# Patient Record
Sex: MALE | Race: WHITE | NOT HISPANIC OR LATINO | Employment: OTHER | URBAN - METROPOLITAN AREA
[De-identification: names, ages, dates, MRNs, and addresses within clinical notes are randomized per-mention and may not be internally consistent; named-entity substitution may affect disease eponyms.]

---

## 2018-05-11 ENCOUNTER — LAB REQUISITION (OUTPATIENT)
Dept: LAB | Facility: HOSPITAL | Age: 53
End: 2018-05-11

## 2018-05-11 ENCOUNTER — TRANSCRIBE ORDERS (OUTPATIENT)
Dept: LAB | Facility: HOSPITAL | Age: 53
End: 2018-05-11

## 2018-05-11 ENCOUNTER — LAB (OUTPATIENT)
Dept: LAB | Facility: HOSPITAL | Age: 53
End: 2018-05-11

## 2018-05-11 DIAGNOSIS — K50.119 CROHN'S DISEASE OF COLON WITH COMPLICATION (HCC): Primary | ICD-10-CM

## 2018-05-11 DIAGNOSIS — K74.60 CIRRHOSIS OF LIVER WITHOUT ASCITES, UNSPECIFIED HEPATIC CIRRHOSIS TYPE (HCC): ICD-10-CM

## 2018-05-11 DIAGNOSIS — K50.119 CROHN'S DISEASE OF COLON WITH COMPLICATION (HCC): ICD-10-CM

## 2018-05-11 DIAGNOSIS — Z12.11 ENCOUNTER FOR SCREENING FOR MALIGNANT NEOPLASM OF COLON: ICD-10-CM

## 2018-05-11 LAB
ANION GAP SERPL CALCULATED.3IONS-SCNC: 9 MMOL/L (ref 3–11)
BUN BLD-MCNC: 19 MG/DL (ref 9–23)
BUN/CREAT SERPL: 10.6 (ref 7–25)
CALCIUM SPEC-SCNC: 8.9 MG/DL (ref 8.7–10.4)
CHLORIDE SERPL-SCNC: 102 MMOL/L (ref 99–109)
CO2 SERPL-SCNC: 28 MMOL/L (ref 20–31)
CREAT BLD-MCNC: 1.8 MG/DL (ref 0.6–1.3)
GFR SERPL CREATININE-BSD FRML MDRD: 40 ML/MIN/1.73
GLUCOSE BLD-MCNC: 110 MG/DL (ref 70–100)
POTASSIUM BLD-SCNC: 4.2 MMOL/L (ref 3.5–5.5)
SODIUM BLD-SCNC: 139 MMOL/L (ref 132–146)

## 2018-05-11 PROCEDURE — 36415 COLL VENOUS BLD VENIPUNCTURE: CPT

## 2018-05-11 PROCEDURE — 88305 TISSUE EXAM BY PATHOLOGIST: CPT | Performed by: INTERNAL MEDICINE

## 2018-05-11 PROCEDURE — 80048 BASIC METABOLIC PNL TOTAL CA: CPT

## 2018-05-14 LAB
CYTO UR: NORMAL
LAB AP CASE REPORT: NORMAL
LAB AP CLINICAL INFORMATION: NORMAL
LAB AP DIAGNOSIS COMMENT: NORMAL
Lab: NORMAL
PATH REPORT.FINAL DX SPEC: NORMAL
PATH REPORT.GROSS SPEC: NORMAL

## 2019-04-10 RX ORDER — OMEPRAZOLE 40 MG/1
CAPSULE, DELAYED RELEASE ORAL
Qty: 30 CAPSULE | Refills: 4 | Status: SHIPPED | OUTPATIENT
Start: 2019-04-10 | End: 2019-12-05 | Stop reason: SDUPTHER

## 2019-09-23 ENCOUNTER — OFFICE VISIT (OUTPATIENT)
Dept: GASTROENTEROLOGY | Facility: CLINIC | Age: 54
End: 2019-09-23

## 2019-09-23 VITALS
DIASTOLIC BLOOD PRESSURE: 81 MMHG | HEIGHT: 74 IN | HEART RATE: 80 BPM | SYSTOLIC BLOOD PRESSURE: 150 MMHG | BODY MASS INDEX: 35.7 KG/M2 | WEIGHT: 278.2 LBS

## 2019-09-23 DIAGNOSIS — Z79.899 IMMUNOSUPPRESSION DUE TO DRUG THERAPY (HCC): ICD-10-CM

## 2019-09-23 DIAGNOSIS — K74.60 CIRRHOSIS OF LIVER WITHOUT ASCITES, UNSPECIFIED HEPATIC CIRRHOSIS TYPE (HCC): Primary | ICD-10-CM

## 2019-09-23 DIAGNOSIS — K50.10 CROHN'S DISEASE OF LARGE INTESTINE WITHOUT COMPLICATION (HCC): ICD-10-CM

## 2019-09-23 DIAGNOSIS — D84.821 IMMUNOSUPPRESSION DUE TO DRUG THERAPY (HCC): ICD-10-CM

## 2019-09-23 PROCEDURE — 99214 OFFICE O/P EST MOD 30 MIN: CPT | Performed by: INTERNAL MEDICINE

## 2019-09-23 RX ORDER — PROMETHAZINE HYDROCHLORIDE 25 MG/1
1 TABLET ORAL AS NEEDED
COMMUNITY

## 2019-09-23 RX ORDER — SERTRALINE HYDROCHLORIDE 100 MG/1
1 TABLET, FILM COATED ORAL DAILY
COMMUNITY

## 2019-09-23 RX ORDER — EZETIMIBE 10 MG/1
1 TABLET ORAL DAILY
COMMUNITY
Start: 2019-08-28

## 2019-09-23 RX ORDER — CYANOCOBALAMIN 1000 UG/ML
1 INJECTION, SOLUTION INTRAMUSCULAR; SUBCUTANEOUS WEEKLY
COMMUNITY

## 2019-09-23 RX ORDER — TAMSULOSIN HYDROCHLORIDE 0.4 MG/1
2 CAPSULE ORAL DAILY
COMMUNITY
Start: 2019-08-28

## 2019-09-23 RX ORDER — LOSARTAN POTASSIUM 25 MG/1
1 TABLET ORAL DAILY
COMMUNITY

## 2019-09-23 RX ORDER — OXICONAZOLE NITRATE 10 MG/G
1 CREAM TOPICAL AS NEEDED
COMMUNITY
End: 2021-01-08

## 2019-09-23 NOTE — PROGRESS NOTES
PCP:  Lawrence Richards PA     No referring provider defined for this encounter.    Chief Complaint   Patient presents with   • Crohn's Disease        HPI   Patient comes in for follow-up.  Unfortunately his father recently  and is been under increased stress.  He maintains himself on the Cimzia although it at times has trouble getting it from his insurance company.  He overall is doing well from the standpoint of his Crohn's disease but if he goes longer than 6 weeks without his Cimzia he has more difficulties.  He had an upper endoscopy in 2018 and no varices were seen at that time.  He had a colonoscopy as well which showed fairly inactive disease with some pseudopolyps this was done in May 2018.  He gets regular blood work by his family physician.    Allergies   Allergen Reactions   • Infliximab Anaphylaxis     Remicade   • Ciprofloxacin Swelling   • Hydrocodone Hives   • Morphine Other (See Comments)     migraines   • Oxycodone Hives   • Tramadol Hives   • Tylenol [Acetaminophen] Hives and Itching          Current Outpatient Medications:   •  CIMZIA PREFILLED 2 X 200 MG/ML kit injection, INJECT 400 MG UNDER THE SKIN EVERY 4 WEEKS, Disp: 1 kit, Rfl: 5  •  cyanocobalamin 1000 MCG/ML injection, Inject 1 mL as directed 1 (One) Time Per Week. Bi weekly, Disp: , Rfl:   •  ezetimibe (ZETIA) 10 MG tablet, Take 1 tablet by mouth Daily., Disp: , Rfl:   •  insulin aspart protamine & aspart (NOVOLOG) 70/30 100 unit/mL, Inject 1 mL as directed 2 (Two) Times a Day. sliding scale, Disp: , Rfl:   •  losartan (COZAAR) 25 MG tablet, Take 1 tablet by mouth Daily., Disp: , Rfl:   •  omeprazole (priLOSEC) 40 MG capsule, TAKE ONE CAPSULE BY MOUTH DAILY, Disp: 30 capsule, Rfl: 4  •  oxiconazole (OXISTAT) 1 % cream, Apply 1 application topically to the appropriate area as directed As Needed., Disp: , Rfl:   •  OXYMORPHONE HCL ER PO, Take 1 tablet by mouth 2 (Two) Times a Day., Disp: , Rfl:   •  promethazine (PHENERGAN) 25  MG tablet, Take 1 tablet by mouth As Needed., Disp: , Rfl:   •  sertraline (ZOLOFT) 100 MG tablet, Take 1 tablet by mouth Daily., Disp: , Rfl:   •  tamsulosin (FLOMAX) 0.4 MG capsule 24 hr capsule, Take 1 capsule by mouth Daily., Disp: , Rfl:      Past Medical History:   Diagnosis Date   • Cirrhosis (CMS/HCC)    • Crohn's disease (CMS/HCC)    • Diabetes mellitus (CMS/HCC)    • Hyperlipidemia    History of hepatitis C post therapy.    Past Surgical History:   Procedure Laterality Date   • COLONOSCOPY  2018    may   • ELBOW COLLATERAL LIGAMENT RECONSTRUCTION     • TOTAL HIP ARTHROPLASTY      bilateral   • UPPER GASTROINTESTINAL ENDOSCOPY  2018    may   Middle ear implant  Tonsillectomy  True small bowel obstruction in the past    Social History     Socioeconomic History   • Marital status:      Spouse name: Not on file   • Number of children: Not on file   • Years of education: Not on file   • Highest education level: Not on file   Tobacco Use   • Smoking status: was a former smoker   • Smokeless tobacco: Never Used   Substance and Sexual Activity   • Alcohol use: No     Frequency: Never   • Drug use: No   • Sexual activity: Defer        Family History   Problem Relation Age of Onset   • Colon cancer Neg Hx    • Colon polyps Neg Hx         Review of Systems   Constitutional: Negative for activity change, appetite change, chills, diaphoresis, fatigue, fever, unexpected weight gain and unexpected weight loss.   HENT: Negative for trouble swallowing and voice change.    Gastrointestinal: Negative for abdominal distention, abdominal pain, anal bleeding, blood in stool, constipation, diarrhea, nausea, rectal pain, vomiting, GERD and indigestion.        Vitals:    09/23/19 1440   BP: 150/81   Pulse: 80        Physical Exam   General Appearance: Alert, in no acute distress   Head: Normocephalic, without obvious abnormality, atraumatic   Eyes: Lids and lashes normal, conjunctivae and sclerae normal, no icterus, no  pallor, corneas clear, PERRLA   Ears: Ears appear intact with no abnormalities noted   Throat: No oral lesions, no thrush, oral mucosa moist   Neck: No adenopathy, supple, trachea midline, no thyromegaly, no JVD   Lungs: Clear to auscultation,respirations regular, even and unlabored Heart: Regular rhythm and normal rate, normal S1 and S2, no murmur, no gallop, no rub, no click   Chest Wall: Symmetrical respiratory expansion   Abdomen: Normal bowel sounds, no masses, no organomegaly, soft non-tender, non-distended, no guarding, no rebound tenderness   Extremities: Moves all extremities well, no edema, no cyanosis, no redness   Skin: No bleeding, bruising or rash   Neurologic: Cranial nerves 2 - 12 grossly intact, no focal deficits     Review of systems was reviewed and positives are noted. All of the remaining review of systems in that system are negative.    Anil was seen today for crohn's disease.    Diagnoses and all orders for this visit:    Cirrhosis of liver without ascites, unspecified hepatic cirrhosis type (CMS/HCC)  -     AFP Tumor Marker  -     US Liver; Future    Crohn's disease of large intestine without complication (CMS/HCC)    Immunosuppression due to drug therapy    Other orders  -     SCANNED - LABS    Impressions and plan #1 Crohn's disease: He appears to be doing well.  His disease primarily is in the large intestine.  He has had problems since 1989 and should have a repeat colonoscopy next year primarily as a screening.  Patients with Crohn's colitis have an increased incidence of colon cancer with long-term disease.  His blood work looks good.  There is no significant anemia.  His creatinine is elevated.  His liver chemistries were relatively normal.  His TSH was normal.    2. history of cirrhosis: He has been treated for hepatitis C.  His hepatitis C was diagnosed in 2008.  He was cured with combination therapy including Harvoni.  This was in July 2015.  I will check an ultrasound of the  right upper quadrant and an alpha-fetoprotein level.  He would like to get this done at an outside institution.  Talked about the fact that patients with cirrhosis have an increased risk of hepatocellular carcinoma.    Nader Zamora MD

## 2019-10-02 ENCOUNTER — APPOINTMENT (OUTPATIENT)
Dept: ULTRASOUND IMAGING | Facility: HOSPITAL | Age: 54
End: 2019-10-02

## 2019-11-04 ENCOUNTER — HOSPITAL ENCOUNTER (OUTPATIENT)
Dept: ULTRASOUND IMAGING | Facility: HOSPITAL | Age: 54
Discharge: HOME | End: 2019-11-04
Admitting: INTERNAL MEDICINE

## 2019-11-04 DIAGNOSIS — K74.60 CIRRHOSIS OF LIVER WITHOUT ASCITES, UNSPECIFIED HEPATIC CIRRHOSIS TYPE (HCC): ICD-10-CM

## 2019-11-04 PROCEDURE — 76705 ECHO EXAM OF ABDOMEN: CPT

## 2019-12-10 RX ORDER — OMEPRAZOLE 40 MG/1
CAPSULE, DELAYED RELEASE ORAL
Qty: 90 CAPSULE | Refills: 3 | Status: SHIPPED | OUTPATIENT
Start: 2019-12-10 | End: 2021-02-24 | Stop reason: SDUPTHER

## 2020-06-01 ENCOUNTER — TELEPHONE (OUTPATIENT)
Dept: GASTROENTEROLOGY | Facility: CLINIC | Age: 55
End: 2020-06-01

## 2020-06-01 NOTE — TELEPHONE ENCOUNTER
MR TAVERA RETURNED MY CALL , NOT ABLE TO CHANGE HIS APPT. FROM THURSDAY TO Wednesday. TOLD PT TO KEEP HIS APPT. FOR THURSDAY

## 2021-01-08 ENCOUNTER — OFFICE VISIT (OUTPATIENT)
Dept: GASTROENTEROLOGY | Facility: CLINIC | Age: 56
End: 2021-01-08

## 2021-01-08 VITALS
BODY MASS INDEX: 32.67 KG/M2 | HEIGHT: 74 IN | WEIGHT: 254.6 LBS | TEMPERATURE: 97.2 F | DIASTOLIC BLOOD PRESSURE: 90 MMHG | SYSTOLIC BLOOD PRESSURE: 193 MMHG | HEART RATE: 88 BPM

## 2021-01-08 DIAGNOSIS — K74.60 CIRRHOSIS OF LIVER WITHOUT ASCITES, UNSPECIFIED HEPATIC CIRRHOSIS TYPE (HCC): Primary | ICD-10-CM

## 2021-01-08 PROCEDURE — 99214 OFFICE O/P EST MOD 30 MIN: CPT | Performed by: NURSE PRACTITIONER

## 2021-01-08 RX ORDER — GABAPENTIN 300 MG/1
1 CAPSULE ORAL 4 TIMES DAILY
COMMUNITY
Start: 2020-12-16

## 2021-01-08 RX ORDER — TRAZODONE HYDROCHLORIDE 50 MG/1
1 TABLET ORAL DAILY
COMMUNITY
Start: 2020-11-02

## 2021-01-08 RX ORDER — OXYMORPHONE HYDROCHLORIDE 10 MG/1
1 TABLET, FILM COATED, EXTENDED RELEASE ORAL 2 TIMES DAILY
COMMUNITY
Start: 2020-12-19

## 2021-01-08 RX ORDER — ROSUVASTATIN CALCIUM 20 MG/1
1 TABLET, COATED ORAL DAILY
COMMUNITY
Start: 2020-12-09

## 2021-01-08 RX ORDER — TIZANIDINE 4 MG/1
1 TABLET ORAL AS NEEDED
COMMUNITY
Start: 2020-12-07

## 2021-01-08 RX ORDER — NITROGLYCERIN 0.4 MG/1
1 TABLET SUBLINGUAL AS NEEDED
COMMUNITY
Start: 2020-12-01

## 2021-01-08 NOTE — PROGRESS NOTES
GASTROENTEROLOGY OFFICE NOTE  Anil Inman Jr.  [id number]  1965    CARE TEAM  Patient Care Team:  Lawrence Richards PA as PCP - General (Family Medicine)  Nader Zamora MD as Consulting Physician (Gastroenterology)    Referring Provider: Lawrence Richards PA    Chief Complaint   Patient presents with   • Cirrhosis   • Crohn's Disease        HISTORY OF PRESENT ILLNESS:  Mr. Inman is a 55-year-old male seen today in follow-up with Crohn's disease diagnosed in 1989 and cirrhosis secondary to chronic hepatitis C, previously treated with Harvoni (2008).    His Crohn's disease is well controlled taking Cimzia.  He has previously had therapeutic failure to 6-MP and had an allergic reaction to Remicade.  He has been on Cimzia for several years now and continues to do very well.  His last colonoscopy was in May 2018 which showed fairly inactive disease with some pseudopolyps.    He reports to me today that his cardiologist at  is planning surgical intervention for some blockages.  The patient explains that this is not an emergent intervention however, his cardiologist does not feel as if stenting is the best option for him.  Given his history of cirrhosis, his cardiologist advised him to follow-up with hepatology to have any testing related to cirrhosis updated prior to surgery.    His last liver imaging for HCC surveillance was in September 2019.  Ultrasound showed coarse echotexture of the liver without liver lesions noted.  Record of his last EGD is not available, patient believes that it was at least 3 years ago.  He has had no signs of GI bleeding.  He denies any unintentional weight gain or edema.  He denies any evidence of hepatic encephalopathy.        PAST MEDICAL HISTORY  Past Medical History:   Diagnosis Date   • Cirrhosis (CMS/HCC)    • Crohn's disease (CMS/HCC)    • Diabetes mellitus (CMS/HCC)    • Hyperlipidemia         PAST SURGICAL HISTORY  Past Surgical History:   Procedure  Laterality Date   • COLONOSCOPY  2018    may   • ELBOW COLLATERAL LIGAMENT RECONSTRUCTION     • TOTAL HIP ARTHROPLASTY      bilateral   • UPPER GASTROINTESTINAL ENDOSCOPY  2018    may        MEDICATIONS:    Current Outpatient Medications:   •  certolizumab pegol (Cimzia Prefilled) 2 X 200 MG/ML kit injection, INJECT 2 SYRINGES SUBCUTANEOUSLY EVERY 4 WEEKS. KEEP REFRIGERATED. DO NOT FREEZE. SINGLE-USE SYRINGE., Disp: 1 kit, Rfl: 0  •  cyanocobalamin 1000 MCG/ML injection, Inject 1 mL as directed 1 (One) Time Per Week. Bi weekly, Disp: , Rfl:   •  ezetimibe (ZETIA) 10 MG tablet, Take 1 tablet by mouth Daily., Disp: , Rfl:   •  gabapentin (NEURONTIN) 300 MG capsule, Take 1 capsule by mouth 3 (Three) Times a Day., Disp: , Rfl:   •  insulin aspart protamine & aspart (NOVOLOG) 70/30 100 unit/mL, Inject 1 mL as directed 2 (Two) Times a Day. sliding scale, Disp: , Rfl:   •  losartan (COZAAR) 25 MG tablet, Take 1 tablet by mouth Daily., Disp: , Rfl:   •  nitroglycerin (NITROSTAT) 0.4 MG SL tablet, Take 1 tablet by mouth As Needed., Disp: , Rfl:   •  omeprazole (priLOSEC) 40 MG capsule, TAKE ONE CAPSULE BY MOUTH DAILY, Disp: 90 capsule, Rfl: 3  •  oxyMORphone ER (OPANA ER) 10 MG 12 hr tablet, Take 1 tablet by mouth 2 (two) times a day., Disp: , Rfl:   •  OXYMORPHONE HCL ER PO, Take 1 tablet by mouth 2 (Two) Times a Day., Disp: , Rfl:   •  promethazine (PHENERGAN) 25 MG tablet, Take 1 tablet by mouth As Needed., Disp: , Rfl:   •  rosuvastatin (CRESTOR) 20 MG tablet, Take 1 tablet by mouth Daily., Disp: , Rfl:   •  sertraline (ZOLOFT) 100 MG tablet, Take 1 tablet by mouth Daily., Disp: , Rfl:   •  tamsulosin (FLOMAX) 0.4 MG capsule 24 hr capsule, Take 1 capsule by mouth Daily., Disp: , Rfl:   •  tiZANidine (ZANAFLEX) 4 MG tablet, Take 1 tablet by mouth As Needed., Disp: , Rfl:   •  traZODone (DESYREL) 50 MG tablet, Take 1 tablet by mouth Daily., Disp: , Rfl:     ALLERGIES  Allergies   Allergen Reactions   • Infliximab  "Anaphylaxis     Remicade   • Ciprofloxacin Swelling   • Hydrocodone Hives   • Morphine Other (See Comments)     migraines   • Oxycodone Hives   • Tramadol Hives   • Tylenol [Acetaminophen] Hives and Itching       FAMILY HISTORY:  Family History   Problem Relation Age of Onset   • Colon cancer Neg Hx    • Colon polyps Neg Hx        SOCIAL HISTORY  Social History     Socioeconomic History   • Marital status: Single     Spouse name: Not on file   • Number of children: Not on file   • Years of education: Not on file   • Highest education level: Not on file   Tobacco Use   • Smoking status: Never Smoker   • Smokeless tobacco: Never Used   Substance and Sexual Activity   • Alcohol use: No     Frequency: Never   • Drug use: No   • Sexual activity: Defer       REVIEW OF SYSTEMS  Review of Systems   Constitutional: Negative for activity change, appetite change, chills, diaphoresis, fatigue, fever, unexpected weight gain and unexpected weight loss.   HENT: Negative for trouble swallowing and voice change.    Gastrointestinal: Negative for abdominal distention, abdominal pain, anal bleeding, blood in stool, constipation, diarrhea, nausea, rectal pain, vomiting, GERD and indigestion.         PHYSICAL EXAM   BP (!) 193/90 (BP Location: Right arm, Patient Position: Sitting, Cuff Size: Adult)   Pulse 88   Temp 97.2 °F (36.2 °C) (Temporal)   Ht 188 cm (74\")   Wt 115 kg (254 lb 9.6 oz)   BMI 32.69 kg/m²   Physical Exam  Vitals signs and nursing note reviewed.   Constitutional:       Appearance: Normal appearance. He is well-developed.   HENT:      Head: Normocephalic and atraumatic.      Nose: Nose normal.      Mouth/Throat:      Mouth: Mucous membranes are moist.      Pharynx: Oropharynx is clear.   Eyes:      Pupils: Pupils are equal, round, and reactive to light.   Neck:      Musculoskeletal: Normal range of motion and neck supple.   Cardiovascular:      Rate and Rhythm: Normal rate and regular rhythm.   Pulmonary:      " Effort: Pulmonary effort is normal.      Breath sounds: Normal breath sounds. No wheezing or rales.   Abdominal:      General: Bowel sounds are normal.      Palpations: Abdomen is soft. There is no mass.      Tenderness: There is no abdominal tenderness. There is no guarding or rebound.      Hernia: No hernia is present.   Musculoskeletal: Normal range of motion.   Skin:     General: Skin is warm and dry.   Neurological:      Mental Status: He is alert and oriented to person, place, and time.      Cranial Nerves: No cranial nerve deficit.   Psychiatric:         Behavior: Behavior normal.         Judgment: Judgment normal.         Results Review:  [facility] records reviewed and discussed with patient.      Discussion:  Operative risk correlates with the severity of the underlying liver disease and the nature of the surgical procedure. Most surgical procedures, whether performed under general, spinal or epidural anesthesia, are followed by minor elevations in serum liver biochemical test levels. Minor postoperative elevations of serum aminotransferase, alkaline phosphatase or bilirubin levels in patients without underlying liver disease are not clinically significant. However, in patients with underlying liver disease, and especially those with compromised hepatic synthetic function, surgery can precipitate hepatic decompensation. Operative risk correlates with the severity of the underlying liver disease and the nature of the surgical procedure. In patients with cirrhosis, the Child Gilmore class and Model for End-Stage Liver Disease (MELD) score have been demonstrated to correlate with preoperative risk.    ASSESSMENT / PLAN  1.  Crohn's disease  2.  Cirrhosis, compensated  Plan:  -Continue Cimzia  -Colonoscopy  -EGD for esophageal variceal surveillance  -Ultrasound abdomen for HCC surveillance  -CBC, CMP, PT/INR, AFP (patient plans to have blood work drawn at his PCP office)    Return in about 6 months (around  7/8/2021).    I discussed the patients findings and my recommendations with patient    Faustino Blanca APRN

## 2021-01-13 ENCOUNTER — HOSPITAL ENCOUNTER (OUTPATIENT)
Dept: ULTRASOUND IMAGING | Facility: HOSPITAL | Age: 56
End: 2021-01-13

## 2021-01-17 ENCOUNTER — APPOINTMENT (OUTPATIENT)
Dept: PREADMISSION TESTING | Facility: HOSPITAL | Age: 56
End: 2021-01-17

## 2021-01-18 ENCOUNTER — TELEPHONE (OUTPATIENT)
Dept: GASTROENTEROLOGY | Facility: CLINIC | Age: 56
End: 2021-01-18

## 2021-01-19 RX ORDER — SODIUM, POTASSIUM,MAG SULFATES 17.5-3.13G
2 SOLUTION, RECONSTITUTED, ORAL ORAL TAKE AS DIRECTED
Qty: 354 ML | Refills: 0 | Status: SHIPPED | OUTPATIENT
Start: 2021-01-19 | End: 2021-01-25 | Stop reason: CLARIF

## 2021-01-19 NOTE — TELEPHONE ENCOUNTER
Spoke to patient and he states that he did give there office the lab orders so he was not sure why 2 of the 4 test was missed. I called Lawrence Richards office in regards to status of missing test or if we would need to redraw patients blood. Still awaiting call back.

## 2021-01-20 ENCOUNTER — APPOINTMENT (OUTPATIENT)
Dept: ULTRASOUND IMAGING | Facility: HOSPITAL | Age: 56
End: 2021-01-20

## 2021-01-24 ENCOUNTER — APPOINTMENT (OUTPATIENT)
Dept: PREADMISSION TESTING | Facility: HOSPITAL | Age: 56
End: 2021-01-24

## 2021-01-25 ENCOUNTER — APPOINTMENT (OUTPATIENT)
Dept: PREADMISSION TESTING | Facility: HOSPITAL | Age: 56
End: 2021-01-25

## 2021-01-25 LAB — SARS-COV-2 RNA RESP QL NAA+PROBE: NOT DETECTED

## 2021-01-25 PROCEDURE — C9803 HOPD COVID-19 SPEC COLLECT: HCPCS

## 2021-01-25 PROCEDURE — U0004 COV-19 TEST NON-CDC HGH THRU: HCPCS

## 2021-01-27 ENCOUNTER — ANESTHESIA (OUTPATIENT)
Dept: GASTROENTEROLOGY | Facility: HOSPITAL | Age: 56
End: 2021-01-27

## 2021-01-27 ENCOUNTER — ANESTHESIA EVENT (OUTPATIENT)
Dept: GASTROENTEROLOGY | Facility: HOSPITAL | Age: 56
End: 2021-01-27

## 2021-01-27 ENCOUNTER — HOSPITAL ENCOUNTER (OUTPATIENT)
Facility: HOSPITAL | Age: 56
Setting detail: HOSPITAL OUTPATIENT SURGERY
Discharge: HOME | End: 2021-01-27
Attending: INTERNAL MEDICINE | Admitting: INTERNAL MEDICINE

## 2021-01-27 VITALS
SYSTOLIC BLOOD PRESSURE: 165 MMHG | RESPIRATION RATE: 16 BRPM | TEMPERATURE: 97.3 F | HEIGHT: 74 IN | BODY MASS INDEX: 32.47 KG/M2 | OXYGEN SATURATION: 97 % | WEIGHT: 253 LBS | DIASTOLIC BLOOD PRESSURE: 93 MMHG | HEART RATE: 64 BPM

## 2021-01-27 DIAGNOSIS — K50.90 CROHN'S DISEASE (HCC): ICD-10-CM

## 2021-01-27 LAB
ALBUMIN SERPL-MCNC: 4.2 G/DL (ref 3.5–5.2)
ALBUMIN/GLOB SERPL: 1.2 G/DL
ALP SERPL-CCNC: 74 U/L (ref 39–117)
ALPHA-FETOPROTEIN: 2.78 NG/ML (ref 0–8.3)
ALT SERPL W P-5'-P-CCNC: 21 U/L (ref 1–41)
ANION GAP SERPL CALCULATED.3IONS-SCNC: 12 MMOL/L (ref 5–15)
AST SERPL-CCNC: 23 U/L (ref 1–40)
BASOPHILS # BLD AUTO: 0.07 10*3/MM3 (ref 0–0.2)
BASOPHILS NFR BLD AUTO: 0.8 % (ref 0–1.5)
BILIRUB SERPL-MCNC: 0.8 MG/DL (ref 0–1.2)
BUN SERPL-MCNC: 16 MG/DL (ref 6–20)
BUN/CREAT SERPL: 9.2 (ref 7–25)
CALCIUM SPEC-SCNC: 8.7 MG/DL (ref 8.6–10.5)
CHLORIDE SERPL-SCNC: 101 MMOL/L (ref 98–107)
CO2 SERPL-SCNC: 26 MMOL/L (ref 22–29)
CREAT SERPL-MCNC: 1.74 MG/DL (ref 0.76–1.27)
DEPRECATED RDW RBC AUTO: 43.1 FL (ref 37–54)
EOSINOPHIL # BLD AUTO: 0.14 10*3/MM3 (ref 0–0.4)
EOSINOPHIL NFR BLD AUTO: 1.5 % (ref 0.3–6.2)
ERYTHROCYTE [DISTWIDTH] IN BLOOD BY AUTOMATED COUNT: 14.2 % (ref 12.3–15.4)
GFR SERPL CREATININE-BSD FRML MDRD: 41 ML/MIN/1.73
GLOBULIN UR ELPH-MCNC: 3.4 GM/DL
GLUCOSE SERPL-MCNC: 133 MG/DL (ref 65–99)
HCT VFR BLD AUTO: 40.5 % (ref 37.5–51)
HGB BLD-MCNC: 13.4 G/DL (ref 13–17.7)
IMM GRANULOCYTES # BLD AUTO: 0.04 10*3/MM3 (ref 0–0.05)
IMM GRANULOCYTES NFR BLD AUTO: 0.4 % (ref 0–0.5)
INR PPP: 1.16 (ref 0.85–1.16)
LYMPHOCYTES # BLD AUTO: 3.36 10*3/MM3 (ref 0.7–3.1)
LYMPHOCYTES NFR BLD AUTO: 37 % (ref 19.6–45.3)
MCH RBC QN AUTO: 27.8 PG (ref 26.6–33)
MCHC RBC AUTO-ENTMCNC: 33.1 G/DL (ref 31.5–35.7)
MCV RBC AUTO: 84 FL (ref 79–97)
MONOCYTES # BLD AUTO: 0.49 10*3/MM3 (ref 0.1–0.9)
MONOCYTES NFR BLD AUTO: 5.4 % (ref 5–12)
NEUTROPHILS NFR BLD AUTO: 4.98 10*3/MM3 (ref 1.7–7)
NEUTROPHILS NFR BLD AUTO: 54.9 % (ref 42.7–76)
NRBC BLD AUTO-RTO: 0 /100 WBC (ref 0–0.2)
PLATELET # BLD AUTO: 132 10*3/MM3 (ref 140–450)
PMV BLD AUTO: 9.7 FL (ref 6–12)
POTASSIUM SERPL-SCNC: 4.2 MMOL/L (ref 3.5–5.2)
PROT SERPL-MCNC: 7.6 G/DL (ref 6–8.5)
PROTHROMBIN TIME: 14.5 SECONDS (ref 11.5–14)
QT INTERVAL: 430 MS
QTC INTERVAL: 450 MS
RBC # BLD AUTO: 4.82 10*6/MM3 (ref 4.14–5.8)
SODIUM SERPL-SCNC: 139 MMOL/L (ref 136–145)
WBC # BLD AUTO: 9.08 10*3/MM3 (ref 3.4–10.8)

## 2021-01-27 PROCEDURE — 80053 COMPREHEN METABOLIC PANEL: CPT | Performed by: ANESTHESIOLOGY

## 2021-01-27 PROCEDURE — 25010000002 PROPOFOL 10 MG/ML EMULSION: Performed by: NURSE ANESTHETIST, CERTIFIED REGISTERED

## 2021-01-27 PROCEDURE — 82105 ALPHA-FETOPROTEIN SERUM: CPT | Performed by: ANESTHESIOLOGY

## 2021-01-27 PROCEDURE — 88305 TISSUE EXAM BY PATHOLOGIST: CPT | Performed by: INTERNAL MEDICINE

## 2021-01-27 PROCEDURE — 45380 COLONOSCOPY AND BIOPSY: CPT | Performed by: INTERNAL MEDICINE

## 2021-01-27 PROCEDURE — 93010 ELECTROCARDIOGRAM REPORT: CPT | Performed by: INTERNAL MEDICINE

## 2021-01-27 PROCEDURE — 85025 COMPLETE CBC W/AUTO DIFF WBC: CPT | Performed by: ANESTHESIOLOGY

## 2021-01-27 PROCEDURE — 85610 PROTHROMBIN TIME: CPT | Performed by: ANESTHESIOLOGY

## 2021-01-27 PROCEDURE — 93005 ELECTROCARDIOGRAM TRACING: CPT | Performed by: ANESTHESIOLOGY

## 2021-01-27 PROCEDURE — 25010000003 LIDOCAINE 1 % SOLUTION: Performed by: NURSE ANESTHETIST, CERTIFIED REGISTERED

## 2021-01-27 RX ORDER — FAMOTIDINE 10 MG/ML
20 INJECTION, SOLUTION INTRAVENOUS ONCE
Status: COMPLETED | OUTPATIENT
Start: 2021-01-27 | End: 2021-01-27

## 2021-01-27 RX ORDER — PROMETHAZINE HYDROCHLORIDE 25 MG/1
25 TABLET ORAL ONCE AS NEEDED
Status: DISCONTINUED | OUTPATIENT
Start: 2021-01-27 | End: 2021-01-27 | Stop reason: HOSPADM

## 2021-01-27 RX ORDER — LIDOCAINE HYDROCHLORIDE 10 MG/ML
INJECTION, SOLUTION INFILTRATION; PERINEURAL AS NEEDED
Status: DISCONTINUED | OUTPATIENT
Start: 2021-01-27 | End: 2021-01-27 | Stop reason: SURG

## 2021-01-27 RX ORDER — PROMETHAZINE HYDROCHLORIDE 25 MG/1
25 SUPPOSITORY RECTAL ONCE AS NEEDED
Status: DISCONTINUED | OUTPATIENT
Start: 2021-01-27 | End: 2021-01-27 | Stop reason: HOSPADM

## 2021-01-27 RX ORDER — ONDANSETRON 2 MG/ML
4 INJECTION INTRAMUSCULAR; INTRAVENOUS ONCE AS NEEDED
Status: DISCONTINUED | OUTPATIENT
Start: 2021-01-27 | End: 2021-01-27 | Stop reason: HOSPADM

## 2021-01-27 RX ORDER — SODIUM CHLORIDE 9 MG/ML
50 INJECTION, SOLUTION INTRAVENOUS CONTINUOUS
Status: DISCONTINUED | OUTPATIENT
Start: 2021-01-27 | End: 2021-01-27 | Stop reason: HOSPADM

## 2021-01-27 RX ORDER — PROPOFOL 10 MG/ML
VIAL (ML) INTRAVENOUS CONTINUOUS PRN
Status: DISCONTINUED | OUTPATIENT
Start: 2021-01-27 | End: 2021-01-27 | Stop reason: SURG

## 2021-01-27 RX ADMIN — PROPOFOL 100 MCG/KG/MIN: 10 INJECTION, EMULSION INTRAVENOUS at 13:47

## 2021-01-27 RX ADMIN — SODIUM CHLORIDE 50 ML/HR: 9 INJECTION, SOLUTION INTRAVENOUS at 12:50

## 2021-01-27 RX ADMIN — FAMOTIDINE 20 MG: 10 INJECTION INTRAVENOUS at 12:50

## 2021-01-27 RX ADMIN — SODIUM CHLORIDE 50 ML/HR: 9 INJECTION, SOLUTION INTRAVENOUS at 12:51

## 2021-01-27 RX ADMIN — LIDOCAINE HYDROCHLORIDE 50 MG: 10 INJECTION, SOLUTION INFILTRATION; PERINEURAL at 13:47

## 2021-01-27 NOTE — ANESTHESIA PREPROCEDURE EVALUATION
Anesthesia Evaluation     Patient summary reviewed and Nursing notes reviewed   NPO Solid Status: > 8 hours  NPO Liquid Status: > 8 hours           Airway   Mallampati: I  TM distance: >3 FB  Neck ROM: full  No difficulty expected  Dental    (+) upper dentures    Pulmonary    (+) a smoker,   (-) COPD, asthma, shortness of breath, recent URI, sleep apnea  Cardiovascular     ECG reviewed    (+) CAD, angina (stable awaiting CABG), hyperlipidemia,   (-) hypertension, dysrhythmias, cardiac stents    ROS comment: ECG NSR  NS intraventricular block  Inferior infarct , age undetermined    Cath at [facility]  showing CAD    Neuro/Psych  (-) seizures, CVA  GI/Hepatic/Renal/Endo    (+)   liver disease (cirrhosis) history of elevated LFT, diabetes mellitus type 2,   (-) no renal disease, no thyroid disorder    Musculoskeletal     Abdominal    Substance History      OB/GYN          Other        ROS/Med Hx Other: Labs ECG pending                 Anesthesia Plan    ASA 4     general and MAC   (Hi risk  CAD  Titrate PFL/use lionel to maintain MAP >65)  intravenous induction     Anesthetic plan, all risks, benefits, and alternatives have been provided, discussed and informed consent has been obtained with: patient.    Plan discussed with CRNA.

## 2021-01-27 NOTE — ANESTHESIA POSTPROCEDURE EVALUATION
Patient: Anil Inman Jr.    Procedure Summary     Date: 01/27/21 Room / Location:  [facility]    Anesthesia Start: 1343 Anesthesia Stop:     Procedure: COLONOSCOPY (N/A ) Diagnosis:     Surgeon: Nader Zamora MD Provider: Juan London MD    Anesthesia Type: general, MAC ASA Status: 4          Anesthesia Type: general, MAC    Vitals  Vitals Value Taken Time   BP     Temp     Pulse 67 01/27/21 1410   Resp     SpO2 96 % 01/27/21 1410   Vitals shown include unvalidated device data.        Post Anesthesia Care and Evaluation    Patient location during evaluation: PACU  Patient participation: complete - patient participated  Level of consciousness: awake  Pain management: adequate  Airway patency: patent  Anesthetic complications: No anesthetic complications  PONV Status: none  Cardiovascular status: acceptable  Respiratory status: acceptable  Hydration status: acceptable

## 2021-01-28 LAB
CYTO UR: NORMAL
LAB AP CASE REPORT: NORMAL
LAB AP CLINICAL INFORMATION: NORMAL
LAB AP DIAGNOSIS COMMENT: NORMAL
PATH REPORT.FINAL DX SPEC: NORMAL
PATH REPORT.GROSS SPEC: NORMAL

## 2021-01-29 ENCOUNTER — PREP FOR SURGERY (OUTPATIENT)
Dept: OTHER | Facility: HOSPITAL | Age: 56
End: 2021-01-29

## 2021-01-29 DIAGNOSIS — K74.60 CIRRHOSIS OF LIVER WITHOUT ASCITES, UNSPECIFIED HEPATIC CIRRHOSIS TYPE (HCC): Primary | ICD-10-CM

## 2021-01-29 RX ORDER — SODIUM CHLORIDE, SODIUM LACTATE, POTASSIUM CHLORIDE, CALCIUM CHLORIDE 600; 310; 30; 20 MG/100ML; MG/100ML; MG/100ML; MG/100ML
30 INJECTION, SOLUTION INTRAVENOUS CONTINUOUS PRN
Status: CANCELLED | OUTPATIENT
Start: 2021-01-29

## 2021-01-31 ENCOUNTER — APPOINTMENT (OUTPATIENT)
Dept: PREADMISSION TESTING | Facility: HOSPITAL | Age: 56
End: 2021-01-31

## 2021-02-01 ENCOUNTER — APPOINTMENT (OUTPATIENT)
Dept: PREADMISSION TESTING | Facility: HOSPITAL | Age: 56
End: 2021-02-01

## 2021-02-01 DIAGNOSIS — K74.60 CIRRHOSIS OF LIVER WITHOUT ASCITES, UNSPECIFIED HEPATIC CIRRHOSIS TYPE (HCC): Primary | ICD-10-CM

## 2021-02-01 LAB — SARS-COV-2 RNA RESP QL NAA+PROBE: NOT DETECTED

## 2021-02-01 PROCEDURE — C9803 HOPD COVID-19 SPEC COLLECT: HCPCS

## 2021-02-01 PROCEDURE — U0004 COV-19 TEST NON-CDC HGH THRU: HCPCS

## 2021-02-03 ENCOUNTER — ANESTHESIA EVENT (OUTPATIENT)
Dept: GASTROENTEROLOGY | Facility: HOSPITAL | Age: 56
End: 2021-02-03

## 2021-02-03 ENCOUNTER — HOSPITAL ENCOUNTER (OUTPATIENT)
Facility: HOSPITAL | Age: 56
Setting detail: HOSPITAL OUTPATIENT SURGERY
Discharge: HOME | End: 2021-02-03
Attending: INTERNAL MEDICINE | Admitting: INTERNAL MEDICINE

## 2021-02-03 ENCOUNTER — HOSPITAL ENCOUNTER (OUTPATIENT)
Dept: ULTRASOUND IMAGING | Facility: HOSPITAL | Age: 56
Discharge: HOME | End: 2021-02-03

## 2021-02-03 ENCOUNTER — ANESTHESIA (OUTPATIENT)
Dept: GASTROENTEROLOGY | Facility: HOSPITAL | Age: 56
End: 2021-02-03

## 2021-02-03 VITALS
DIASTOLIC BLOOD PRESSURE: 88 MMHG | BODY MASS INDEX: 32.54 KG/M2 | SYSTOLIC BLOOD PRESSURE: 120 MMHG | TEMPERATURE: 97 F | HEART RATE: 67 BPM | OXYGEN SATURATION: 93 % | WEIGHT: 253.53 LBS | RESPIRATION RATE: 18 BRPM | HEIGHT: 74 IN

## 2021-02-03 DIAGNOSIS — K74.60 CIRRHOSIS OF LIVER WITHOUT ASCITES, UNSPECIFIED HEPATIC CIRRHOSIS TYPE (HCC): ICD-10-CM

## 2021-02-03 LAB
GLUCOSE BLDC GLUCOMTR-MCNC: 101 MG/DL (ref 70–130)
GLUCOSE BLDC GLUCOMTR-MCNC: 114 MG/DL (ref 70–130)

## 2021-02-03 PROCEDURE — 76705 ECHO EXAM OF ABDOMEN: CPT

## 2021-02-03 PROCEDURE — 43235 EGD DIAGNOSTIC BRUSH WASH: CPT | Performed by: INTERNAL MEDICINE

## 2021-02-03 PROCEDURE — 25010000002 PROPOFOL 10 MG/ML EMULSION: Performed by: NURSE ANESTHETIST, CERTIFIED REGISTERED

## 2021-02-03 PROCEDURE — 82962 GLUCOSE BLOOD TEST: CPT

## 2021-02-03 RX ORDER — PROMETHAZINE HYDROCHLORIDE 25 MG/1
25 TABLET ORAL ONCE AS NEEDED
Status: DISCONTINUED | OUTPATIENT
Start: 2021-02-03 | End: 2021-02-03 | Stop reason: HOSPADM

## 2021-02-03 RX ORDER — FAMOTIDINE 20 MG/1
20 TABLET, FILM COATED ORAL ONCE
Status: DISCONTINUED | OUTPATIENT
Start: 2021-02-03 | End: 2021-02-03 | Stop reason: HOSPADM

## 2021-02-03 RX ORDER — PROPOFOL 10 MG/ML
VIAL (ML) INTRAVENOUS AS NEEDED
Status: DISCONTINUED | OUTPATIENT
Start: 2021-02-03 | End: 2021-02-03 | Stop reason: SURG

## 2021-02-03 RX ORDER — ONDANSETRON 2 MG/ML
4 INJECTION INTRAMUSCULAR; INTRAVENOUS ONCE AS NEEDED
Status: DISCONTINUED | OUTPATIENT
Start: 2021-02-03 | End: 2021-02-03 | Stop reason: HOSPADM

## 2021-02-03 RX ORDER — LIDOCAINE HYDROCHLORIDE 10 MG/ML
INJECTION, SOLUTION EPIDURAL; INFILTRATION; INTRACAUDAL; PERINEURAL AS NEEDED
Status: DISCONTINUED | OUTPATIENT
Start: 2021-02-03 | End: 2021-02-03 | Stop reason: SURG

## 2021-02-03 RX ORDER — SODIUM CHLORIDE 9 MG/ML
50 INJECTION, SOLUTION INTRAVENOUS CONTINUOUS
Status: DISCONTINUED | OUTPATIENT
Start: 2021-02-03 | End: 2021-02-03 | Stop reason: HOSPADM

## 2021-02-03 RX ORDER — PROMETHAZINE HYDROCHLORIDE 25 MG/1
25 SUPPOSITORY RECTAL ONCE AS NEEDED
Status: DISCONTINUED | OUTPATIENT
Start: 2021-02-03 | End: 2021-02-03 | Stop reason: HOSPADM

## 2021-02-03 RX ORDER — FENTANYL CITRATE 50 UG/ML
50 INJECTION, SOLUTION INTRAMUSCULAR; INTRAVENOUS
Status: CANCELLED | OUTPATIENT
Start: 2021-02-03

## 2021-02-03 RX ORDER — MIDAZOLAM HYDROCHLORIDE 1 MG/ML
1 INJECTION INTRAMUSCULAR; INTRAVENOUS
Status: DISCONTINUED | OUTPATIENT
Start: 2021-02-03 | End: 2021-02-03 | Stop reason: HOSPADM

## 2021-02-03 RX ORDER — SODIUM CHLORIDE 0.9 % (FLUSH) 0.9 %
10 SYRINGE (ML) INJECTION AS NEEDED
Status: DISCONTINUED | OUTPATIENT
Start: 2021-02-03 | End: 2021-02-03 | Stop reason: HOSPADM

## 2021-02-03 RX ORDER — SODIUM CHLORIDE, SODIUM LACTATE, POTASSIUM CHLORIDE, CALCIUM CHLORIDE 600; 310; 30; 20 MG/100ML; MG/100ML; MG/100ML; MG/100ML
9 INJECTION, SOLUTION INTRAVENOUS CONTINUOUS
Status: DISCONTINUED | OUTPATIENT
Start: 2021-02-03 | End: 2021-02-03 | Stop reason: HOSPADM

## 2021-02-03 RX ORDER — IPRATROPIUM BROMIDE AND ALBUTEROL SULFATE 2.5; .5 MG/3ML; MG/3ML
3 SOLUTION RESPIRATORY (INHALATION) ONCE AS NEEDED
Status: DISCONTINUED | OUTPATIENT
Start: 2021-02-03 | End: 2021-02-03 | Stop reason: HOSPADM

## 2021-02-03 RX ORDER — MIDAZOLAM HYDROCHLORIDE 1 MG/ML
2 INJECTION INTRAMUSCULAR; INTRAVENOUS
Status: DISCONTINUED | OUTPATIENT
Start: 2021-02-03 | End: 2021-02-03 | Stop reason: HOSPADM

## 2021-02-03 RX ORDER — FAMOTIDINE 10 MG/ML
20 INJECTION, SOLUTION INTRAVENOUS ONCE
Status: COMPLETED | OUTPATIENT
Start: 2021-02-03 | End: 2021-02-03

## 2021-02-03 RX ORDER — LIDOCAINE HYDROCHLORIDE 10 MG/ML
0.5 INJECTION, SOLUTION EPIDURAL; INFILTRATION; INTRACAUDAL; PERINEURAL ONCE AS NEEDED
Status: DISCONTINUED | OUTPATIENT
Start: 2021-02-03 | End: 2021-02-03 | Stop reason: HOSPADM

## 2021-02-03 RX ORDER — SODIUM CHLORIDE 0.9 % (FLUSH) 0.9 %
10 SYRINGE (ML) INJECTION EVERY 12 HOURS SCHEDULED
Status: DISCONTINUED | OUTPATIENT
Start: 2021-02-03 | End: 2021-02-03 | Stop reason: HOSPADM

## 2021-02-03 RX ADMIN — SODIUM CHLORIDE 50 ML/HR: 9 INJECTION, SOLUTION INTRAVENOUS at 11:12

## 2021-02-03 RX ADMIN — PROPOFOL 30 MG: 10 INJECTION, EMULSION INTRAVENOUS at 11:27

## 2021-02-03 RX ADMIN — PROPOFOL 40 MG: 10 INJECTION, EMULSION INTRAVENOUS at 11:25

## 2021-02-03 RX ADMIN — LIDOCAINE HYDROCHLORIDE 100 MG: 10 INJECTION, SOLUTION EPIDURAL; INFILTRATION; INTRACAUDAL; PERINEURAL at 11:24

## 2021-02-03 RX ADMIN — PROPOFOL 130 MG: 10 INJECTION, EMULSION INTRAVENOUS at 11:24

## 2021-02-03 RX ADMIN — FAMOTIDINE 20 MG: 10 INJECTION INTRAVENOUS at 11:08

## 2021-02-03 NOTE — H&P
Gastroenterology Consult Note    Reason for Consultation: Cirrhosis    Patient Care Team:  Lawrence Richards PA as PCP - General (Family Medicine)  Nader Zamora MD as Consulting Physician (Gastroenterology)    Chief complaint: Cirrhosis      History of present illness: The patient is a 55-year-old with a history of cirrhosis secondary to hepatitis C that has been treated.  He is here for an upper endoscopy to evaluate for varices.  He has no bleeding.    Allergies   Allergen Reactions   • Infliximab Anaphylaxis     Remicade   • Ciprofloxacin Swelling   • Hydrocodone Hives   • Morphine Other (See Comments)     migraines   • Oxycodone Hives   • Tramadol Hives   • Tylenol [Acetaminophen] Hives and Itching     Prior to Admission medications    Medication Sig Start Date End Date Taking? Authorizing Provider   Cimzia Prefilled 2 X 200 MG/ML kit injection INJECT 2 SYRINGES SUBCUTANEOUSLY EVERY 4 WEEKS. KEEP REFRIGERATED. DO NOT FREEZE. SINGLE-USE SYRINGE. 1/11/21  Yes Nader Zamora MD   cyanocobalamin 1000 MCG/ML injection Inject 1 mL as directed 1 (One) Time Per Week. Bi weekly   Yes Holden Wilson MD   ezetimibe (ZETIA) 10 MG tablet Take 1 tablet by mouth Daily. 8/28/19  Yes Holden Wilson MD   gabapentin (NEURONTIN) 300 MG capsule Take 1 capsule by mouth 3 (Three) Times a Day. 12/16/20  Yes Holden Wilson MD   insulin aspart protamine & aspart (NOVOLOG) 70/30 100 unit/mL Inject 1 mL as directed 2 (Two) Times a Day. sliding scale   Yes Holden Wilson MD   losartan (COZAAR) 25 MG tablet Take 1 tablet by mouth Daily.   Yes Holden Wilson MD   metoprolol tartrate (LOPRESSOR) 25 MG tablet Take 25 mg by mouth 2 (Two) Times a Day.   Yes Holden Wilson MD   omeprazole (priLOSEC) 40 MG capsule TAKE ONE CAPSULE BY MOUTH DAILY 12/10/19  Yes Nader Zamora MD   OXYMORPHONE HCL ER PO Take 1 tablet by mouth 2 (Two) Times a Day. 6/30/19  Yes Holden Wilson MD    promethazine (PHENERGAN) 25 MG tablet Take 1 tablet by mouth As Needed.   Yes Holden Wilson MD   rosuvastatin (CRESTOR) 20 MG tablet Take 1 tablet by mouth Daily. 12/9/20  Yes Holden Wilson MD   sertraline (ZOLOFT) 100 MG tablet Take 1 tablet by mouth Daily.   Yes Holden Wilson MD   tamsulosin (FLOMAX) 0.4 MG capsule 24 hr capsule Take 1 capsule by mouth Daily. 8/28/19  Yes Holden Wilson MD   tiZANidine (ZANAFLEX) 4 MG tablet Take 1 tablet by mouth As Needed. 12/7/20  Yes Holden Wilson MD   traZODone (DESYREL) 50 MG tablet Take 1 tablet by mouth Daily. 11/2/20  Yes Holden Wilson MD   nitroglycerin (NITROSTAT) 0.4 MG SL tablet Take 1 tablet by mouth As Needed. 12/1/20   Holden Wilson MD   oxyMORphone ER (OPANA ER) 10 MG 12 hr tablet Take 1 tablet by mouth 2 (two) times a day. 12/19/20   Holden Wilson MD   polyethylene glycol (GoLYTELY) 236 g solution Use as directed by provider for colonoscopy prep 1/25/21   Faustino Blanca APRN      Current Facility-Administered Medications   Medication Dose Route Frequency Provider Last Rate Last Admin   • famotidine (PEPCID) tablet 20 mg  20 mg Oral Once Mehran Kingsley MD       • lactated ringers infusion  9 mL/hr Intravenous Continuous Mehran Kingsley MD       • lidocaine PF 1% (XYLOCAINE) injection 0.5 mL  0.5 mL Injection Once PRN Mehran Kingsley MD       • midazolam (VERSED) injection 1 mg  1 mg Intravenous Q10 Min PRN Mehran Kingsley MD        Or   • midazolam (VERSED) injection 2 mg  2 mg Intravenous Q10 Min PRN Mehran Kingsley MD       • sodium chloride 0.9 % flush 10 mL  10 mL Intravenous Q12H Mehran Kingsley MD       • sodium chloride 0.9 % flush 10 mL  10 mL Intravenous PRN Mehran Kingsley MD       • sodium chloride 0.9 % infusion  50 mL/hr Intravenous Continuous Mehran Kingsley MD 50 mL/hr at 02/03/21 1112 50 mL/hr at 02/03/21 1112      Past Medical History:   Diagnosis Date   • Cirrhosis (CMS/HCC)     • Crohn's disease (CMS/HCC)    • Diabetes mellitus (CMS/HCC)    • Hyperlipidemia      Past Surgical History:   Procedure Laterality Date   • COLONOSCOPY  2018    may   • COLONOSCOPY N/A 1/27/2021    Procedure: COLONOSCOPY;  Surgeon: Nader Zamora MD;  Location: [facility] END[facility]COPY;  Service: Gastroenterology;  Laterality: N/A;   • ELBOW COLLATERAL LIGAMENT RECONSTRUCTION     • TOTAL HIP ARTHROPLASTY      bilateral   • UPPER GASTROINTESTINAL ENDOSCOPY  2018    may     Family History   Problem Relation Age of Onset   • Colon cancer Neg Hx    • Colon polyps Neg Hx      GI Family History  No family history of colon polyps or cancers  Social History     Tobacco Use   Smoking Status Never Smoker   Smokeless Tobacco Never Used     Social History     Substance and Sexual Activity   Alcohol Use No   • Frequency: Never      Social History     Substance and Sexual Activity   Drug Use No        ------  Review of Systems    Vital Signs   Temp:  [97.7 °F (36.5 °C)] 97.7 °F (36.5 °C)  Heart Rate:  [62] 62  Resp:  [18] 18  BP: (157)/(76) 157/76    Physical Exam:   General Appearance: Alert, in no acute distress  Head: Normocephalic, without obvious abnormality, atraumatic  Eyes: Lids and lashes normal, conjunctivae and sclerae normal, no icterus, no pallor, corneas clear, PERRLA  Neck: No adenopathy, supple, trachea midline, no thyromegaly, no JVD  Lungs: respirations regular, even and unlabored Heart: Regular rhythm and normal rate, normal S1 and S2  Chest Wall: Symmetrical respiratory expansion  Abdomen: No masses, no organomegaly, soft nontender, nondistended, no guarding, no rebound tenderness  Extremities:  Moves all extremities well, no edema, no cyanosis, no redness  Skin: No bleeding, bruising or rash  Neurologic: Cranial nerves 2 - 12 grossly intact, no focal deficits       LABS:   Lab Results (last 48 hours)     Procedure Component Value Units Date/Time    POC Glucose Once [[id number]]  (Normal) Collected: 02/03/21  1059    Specimen: Blood Updated: 02/03/21 1113     Glucose 114 mg/dL         RADIOLOGY:  Imaging Results (Last 24 Hours)     ** No results found for the last 24 hours. **          Assessment/Plan       Cirrhosis of liver without ascites (CMS/HCC)      Impressions and plan #1 history of cirrhosis: We will plan upper endoscopy to evaluate for any sign of varices.  Risks, benefits and alternatives were explained in detail.    I discussed the patient's findings and my recommendations with patient    Nader Zamora MD  02/03/21  11:22 EST

## 2021-02-03 NOTE — OP NOTE
EGD      Instrument: Olympus video gastroscope      Medications: As per anesthesia    Preop diagnosis: History of cirrhosis      Postop diagnosis: Gastritis      Indications: The patient is a 55-year-old who has been diagnosed with cirrhosis.  He has a history of hepatitis C.  He is here to evaluate for varices.        Procedure: After the patient was informed of the risks, benefits, and alternatives to the procedure, informed consent was obtained.  The endoscope was inserted into the oropharynx, down the esophagus and into the stomach.  The endoscope was then advanced into the duodenum.  Esophageal mucosa appeared to be free of any varices.  There was no Caro's esophagus.  The stomach is remarkable for gastritis.  There was no gastric varices on retroflexion.  The duodenal bulb and descending duodenum were unremarkable.  The patient tolerated the procedure well and there were no immediate complications.        Impressions and plan #1 gastritis    #2 no esophageal or gastric varices seen      Plan: Follow-up in the office      CC Lawrence Richards

## 2021-02-03 NOTE — ANESTHESIA POSTPROCEDURE EVALUATION
Patient: Anil Inman Jr.    Procedure Summary     Date: 02/03/21 Room / Location:  [facility]    Anesthesia Start: 1120 Anesthesia Stop: 1137    Procedure: ESOPHAGOGASTRODUODENOSCOPY (N/A ) Diagnosis:       Cirrhosis of liver without ascites, unspecified hepatic cirrhosis type (CMS/HCC)      (Cirrhosis of liver without ascites, unspecified hepatic cirrhosis type (CMS/HCC) [K74.60])    Surgeon: Nader Zamora MD Provider: Mehran Kingsley MD    Anesthesia Type: general ASA Status: 3          Anesthesia Type: general    Vitals  Vitals Value Taken Time   BP     Temp     Pulse     Resp     SpO2 92 % 02/03/21 1135   Vitals shown include unvalidated device data.        Post Anesthesia Care and Evaluation    Patient location during evaluation: PACU  Patient participation: complete - patient participated  Level of consciousness: awake and alert  Pain management: adequate  Airway patency: patent  Anesthetic complications: No anesthetic complications  PONV Status: none  Cardiovascular status: hemodynamically stable and acceptable  Respiratory status: nonlabored ventilation, acceptable and nasal cannula  Hydration status: acceptable

## 2021-02-03 NOTE — ANESTHESIA PREPROCEDURE EVALUATION
Anesthesia Evaluation     Patient summary reviewed and Nursing notes reviewed                Airway   Mallampati: I  TM distance: >3 FB  Neck ROM: full  No difficulty expected  Dental - normal exam   (+) upper dentures    Pulmonary - normal exam   Cardiovascular - normal exam    (+) CAD, hyperlipidemia,       Neuro/Psych- negative ROS  GI/Hepatic/Renal/Endo    (+)   liver disease (CIRRHOSIS, CHRONIC HEP C), diabetes mellitus,     ROS Comment: CROHN'S DZ    Musculoskeletal (-) negative ROS    Abdominal  - normal exam    Bowel sounds: normal.   Substance History - negative use     OB/GYN negative ob/gyn ROS         Other                        Anesthesia Plan    ASA 3     general   (PROPOFOL)  intravenous induction     Anesthetic plan, all risks, benefits, and alternatives have been provided, discussed and informed consent has been obtained with: patient.    Plan discussed with CRNA.

## 2021-02-24 RX ORDER — OMEPRAZOLE 40 MG/1
40 CAPSULE, DELAYED RELEASE ORAL DAILY
Qty: 90 CAPSULE | Refills: 3 | Status: SHIPPED | OUTPATIENT
Start: 2021-02-24 | End: 2021-07-13 | Stop reason: SDUPTHER

## 2021-07-14 RX ORDER — OMEPRAZOLE 40 MG/1
40 CAPSULE, DELAYED RELEASE ORAL DAILY
Qty: 90 CAPSULE | Refills: 0 | Status: SHIPPED | OUTPATIENT
Start: 2021-07-14 | End: 2021-07-20 | Stop reason: SDUPTHER

## 2021-07-20 ENCOUNTER — PRIOR AUTHORIZATION (OUTPATIENT)
Dept: GASTROENTEROLOGY | Facility: CLINIC | Age: 56
End: 2021-07-20

## 2021-07-20 RX ORDER — OMEPRAZOLE 40 MG/1
40 CAPSULE, DELAYED RELEASE ORAL DAILY
Qty: 90 CAPSULE | Refills: 3 | Status: SHIPPED | OUTPATIENT
Start: 2021-07-20

## 2021-07-20 NOTE — TELEPHONE ENCOUNTER
Patient needs refill on omeprazole 40mg, 1 po daily [facility] [place]  PA is also needed for his cimzia- PA submitted through cover my meds.

## 2021-09-21 NOTE — TELEPHONE ENCOUNTER
Rx Refill Note  Requested Prescriptions     Pending Prescriptions Disp Refills   • Cimzia Prefilled 2 X 200 MG/ML kit injection [Pharmacy Med Name: CIMZIA 200MG PF SYR (2/BOX) 200MG] 1 kit 1     Sig: INJECT 2 SYRINGES SUBCUTANEOUSLY EVERY 4 WEEKS. KEEP REFRIGERATED. DO NOT FREEZE. SINGLE-USE SYRINGE.      Last office visit with prescribing clinician: JODY on 02/03/2021  Next office visit with prescribing clinician: TREVOR Bloom on 10/08/2021           Donal Vallejo MA  09/21/21, 16:19 EDT

## 2021-10-08 ENCOUNTER — OFFICE VISIT (OUTPATIENT)
Dept: GASTROENTEROLOGY | Facility: CLINIC | Age: 56
End: 2021-10-08

## 2021-10-08 ENCOUNTER — LAB (OUTPATIENT)
Dept: LAB | Facility: HOSPITAL | Age: 56
End: 2021-10-08

## 2021-10-08 VITALS
TEMPERATURE: 97.7 F | HEART RATE: 106 BPM | DIASTOLIC BLOOD PRESSURE: 79 MMHG | SYSTOLIC BLOOD PRESSURE: 173 MMHG | WEIGHT: 267.4 LBS | BODY MASS INDEX: 34.32 KG/M2 | HEIGHT: 74 IN

## 2021-10-08 DIAGNOSIS — K74.60 CIRRHOSIS OF LIVER WITHOUT ASCITES, UNSPECIFIED HEPATIC CIRRHOSIS TYPE (HCC): Primary | ICD-10-CM

## 2021-10-08 DIAGNOSIS — K74.60 CIRRHOSIS OF LIVER WITHOUT ASCITES, UNSPECIFIED HEPATIC CIRRHOSIS TYPE (HCC): ICD-10-CM

## 2021-10-08 LAB
ALBUMIN SERPL-MCNC: 4.5 G/DL (ref 3.5–5.2)
ALBUMIN/GLOB SERPL: 1.5 G/DL
ALP SERPL-CCNC: 54 U/L (ref 39–117)
ALT SERPL W P-5'-P-CCNC: 9 U/L (ref 1–41)
ANION GAP SERPL CALCULATED.3IONS-SCNC: 12.3 MMOL/L (ref 5–15)
AST SERPL-CCNC: 14 U/L (ref 1–40)
BASOPHILS # BLD AUTO: 0.08 10*3/MM3 (ref 0–0.2)
BASOPHILS NFR BLD AUTO: 0.7 % (ref 0–1.5)
BILIRUB SERPL-MCNC: 0.6 MG/DL (ref 0–1.2)
BUN SERPL-MCNC: 15 MG/DL (ref 6–20)
BUN/CREAT SERPL: 9.1 (ref 7–25)
CALCIUM SPEC-SCNC: 9 MG/DL (ref 8.6–10.5)
CHLORIDE SERPL-SCNC: 104 MMOL/L (ref 98–107)
CO2 SERPL-SCNC: 25.7 MMOL/L (ref 22–29)
CREAT SERPL-MCNC: 1.64 MG/DL (ref 0.76–1.27)
DEPRECATED RDW RBC AUTO: 45 FL (ref 37–54)
EOSINOPHIL # BLD AUTO: 0.36 10*3/MM3 (ref 0–0.4)
EOSINOPHIL NFR BLD AUTO: 3.2 % (ref 0.3–6.2)
ERYTHROCYTE [DISTWIDTH] IN BLOOD BY AUTOMATED COUNT: 13.8 % (ref 12.3–15.4)
GFR SERPL CREATININE-BSD FRML MDRD: 44 ML/MIN/1.73
GLOBULIN UR ELPH-MCNC: 3.1 GM/DL
GLUCOSE SERPL-MCNC: 161 MG/DL (ref 65–99)
HCT VFR BLD AUTO: 45.2 % (ref 37.5–51)
HGB BLD-MCNC: 14.5 G/DL (ref 13–17.7)
IMM GRANULOCYTES # BLD AUTO: 0.04 10*3/MM3 (ref 0–0.05)
IMM GRANULOCYTES NFR BLD AUTO: 0.4 % (ref 0–0.5)
LYMPHOCYTES # BLD AUTO: 3.31 10*3/MM3 (ref 0.7–3.1)
LYMPHOCYTES NFR BLD AUTO: 29.8 % (ref 19.6–45.3)
MCH RBC QN AUTO: 28.7 PG (ref 26.6–33)
MCHC RBC AUTO-ENTMCNC: 32.1 G/DL (ref 31.5–35.7)
MCV RBC AUTO: 89.5 FL (ref 79–97)
MONOCYTES # BLD AUTO: 0.75 10*3/MM3 (ref 0.1–0.9)
MONOCYTES NFR BLD AUTO: 6.7 % (ref 5–12)
NEUTROPHILS NFR BLD AUTO: 59.2 % (ref 42.7–76)
NEUTROPHILS NFR BLD AUTO: 6.58 10*3/MM3 (ref 1.7–7)
NRBC BLD AUTO-RTO: 0.4 /100 WBC (ref 0–0.2)
PLATELET # BLD AUTO: 160 10*3/MM3 (ref 140–450)
PMV BLD AUTO: 10.1 FL (ref 6–12)
POTASSIUM SERPL-SCNC: 4.7 MMOL/L (ref 3.5–5.2)
PROT SERPL-MCNC: 7.6 G/DL (ref 6–8.5)
RBC # BLD AUTO: 5.05 10*6/MM3 (ref 4.14–5.8)
SODIUM SERPL-SCNC: 142 MMOL/L (ref 136–145)
WBC # BLD AUTO: 11.12 10*3/MM3 (ref 3.4–10.8)

## 2021-10-08 PROCEDURE — 80053 COMPREHEN METABOLIC PANEL: CPT

## 2021-10-08 PROCEDURE — 82105 ALPHA-FETOPROTEIN SERUM: CPT

## 2021-10-08 PROCEDURE — 85025 COMPLETE CBC W/AUTO DIFF WBC: CPT

## 2021-10-08 PROCEDURE — 99214 OFFICE O/P EST MOD 30 MIN: CPT | Performed by: NURSE PRACTITIONER

## 2021-10-08 RX ORDER — INSULIN GLARGINE 100 [IU]/ML
30 INJECTION, SOLUTION SUBCUTANEOUS DAILY
COMMUNITY
Start: 2021-08-24

## 2021-10-08 RX ORDER — INSULIN ASPART 100 [IU]/ML
INJECTION, SOLUTION INTRAVENOUS; SUBCUTANEOUS
COMMUNITY
Start: 2021-08-24

## 2021-10-08 NOTE — PROGRESS NOTES
GASTROENTEROLOGY OFFICE NOTE  Anli Inman Jr.  [id number]  1965    CARE TEAM  Patient Care Team:  Lawrence Richards PA as PCP - General (Family Medicine)  Nader Zamora MD as Consulting Physician (Gastroenterology)    Referring Provider: Lawrence Richards PA    Chief Complaint   Patient presents with   • Hepatic Disease        HISTORY OF PRESENT ILLNESS:  Mr. Inman is a 55-year-old male seen today in follow-up with Crohn's disease diagnosed in 1989 and cirrhosis secondary to chronic hepatitis C, previously treated with Harvoni (2008).    His Crohn's disease is well controlled taking Cimzia.  He has previously had therapeutic failure to 6-MP and had an allergic reaction to Remicade.  He has been on Cimzia for several years now and continues to do very well.  His last colonoscopy was in Jan 2021 which showed fairly inactive disease with some pseudopolyps.    Last liver imaging was in February 2021 that showed the liver to have heterogeneous appearance and coarsened echotexture with no definite underlying mass or lesion present.  EGD in February 2021 showed gastritis, no esophageal or gastric varices.  He denies any GI bleeding.  He denies any evidence of hepatic encephalopathy.  He has no ascites or pedal edema.    His cardiologist at  continues to plan for surgical interventions for some blockages.    PAST MEDICAL HISTORY  Past Medical History:   Diagnosis Date   • Cirrhosis (HCC)    • Crohn's disease (HCC)    • Diabetes mellitus (HCC)    • Hyperlipidemia         PAST SURGICAL HISTORY  Past Surgical History:   Procedure Laterality Date   • COLONOSCOPY  2018    may   • COLONOSCOPY N/A 1/27/2021    Procedure: COLONOSCOPY;  Surgeon: Nader Zamora MD;  Location: [facility] ENDOSCOPY;  Service: Gastroenterology;  Laterality: N/A;   • ELBOW COLLATERAL LIGAMENT RECONSTRUCTION     • ENDOSCOPY N/A 2/3/2021    Procedure: ESOPHAGOGASTRODUODENOSCOPY;  Surgeon: Nader Zamora MD;  Location:  [facility] ENDOSCOPY;  Service: Gastroenterology;  Laterality: N/A;   • TOTAL HIP ARTHROPLASTY      bilateral   • UPPER GASTROINTESTINAL ENDOSCOPY  2018    may        MEDICATIONS:    Current Outpatient Medications:   •  Cimzia Prefilled 2 X 200 MG/ML kit injection, INJECT 2 SYRINGES SUBCUTANEOUSLY EVERY 4 WEEKS. KEEP REFRIGERATED. DO NOT FREEZE. SINGLE-USE SYRINGE., Disp: 1 kit, Rfl: 2  •  cyanocobalamin 1000 MCG/ML injection, Inject 1 mL as directed 1 (One) Time Per Week. Bi weekly, Disp: , Rfl:   •  gabapentin (NEURONTIN) 300 MG capsule, Take 1 capsule by mouth 4 (Four) Times a Day., Disp: , Rfl:   •  insulin aspart protamine & aspart (NOVOLOG) 70/30 100 unit/mL, Inject 1 mL as directed 2 (Two) Times a Day. sliding scale, Disp: , Rfl:   •  losartan (COZAAR) 25 MG tablet, Take 1 tablet by mouth Daily., Disp: , Rfl:   •  metoprolol tartrate (LOPRESSOR) 25 MG tablet, Take 25 mg by mouth 2 (Two) Times a Day., Disp: , Rfl:   •  nitroglycerin (NITROSTAT) 0.4 MG SL tablet, Take 1 tablet by mouth As Needed., Disp: , Rfl:   •  omeprazole (priLOSEC) 40 MG capsule, Take 1 capsule by mouth Daily., Disp: 90 capsule, Rfl: 3  •  promethazine (PHENERGAN) 25 MG tablet, Take 1 tablet by mouth As Needed., Disp: , Rfl:   •  rosuvastatin (CRESTOR) 20 MG tablet, Take 1 tablet by mouth Daily., Disp: , Rfl:   •  sertraline (ZOLOFT) 100 MG tablet, Take 1 tablet by mouth Daily., Disp: , Rfl:   •  tamsulosin (FLOMAX) 0.4 MG capsule 24 hr capsule, Take 2 capsules by mouth Daily., Disp: , Rfl:   •  tiZANidine (ZANAFLEX) 4 MG tablet, Take 1 tablet by mouth As Needed., Disp: , Rfl:   •  ezetimibe (ZETIA) 10 MG tablet, Take 1 tablet by mouth Daily., Disp: , Rfl:   •  Lantus SoloStar 100 UNIT/ML injection pen, Inject 30 Units under the skin into the appropriate area as directed Daily., Disp: , Rfl:   •  mupirocin (BACTROBAN) 2 % ointment, Apply 2 application topically to the appropriate area as directed Daily As Needed., Disp: , Rfl:   •  NovoLOG  "FlexPen 100 UNIT/ML solution pen-injector sc pen, , Disp: , Rfl:   •  oxyMORphone ER (OPANA ER) 10 MG 12 hr tablet, Take 1 tablet by mouth 2 (two) times a day., Disp: , Rfl:   •  OXYMORPHONE HCL ER PO, Take 1 tablet by mouth 2 (Two) Times a Day., Disp: , Rfl:   •  polyethylene glycol (GoLYTELY) 236 g solution, Use as directed by provider for colonoscopy prep, Disp: 4000 mL, Rfl: 0  •  traZODone (DESYREL) 50 MG tablet, Take 1 tablet by mouth Daily., Disp: , Rfl:     ALLERGIES  Allergies   Allergen Reactions   • Infliximab Anaphylaxis     Remicade   • Ciprofloxacin Swelling   • Hydrocodone Hives   • Morphine Other (See Comments)     migraines   • Oxycodone Hives   • Tramadol Hives   • Tylenol [Acetaminophen] Hives and Itching       FAMILY HISTORY:  Family History   Problem Relation Age of Onset   • Colon cancer Neg Hx    • Colon polyps Neg Hx        SOCIAL HISTORY  Social History     Socioeconomic History   • Marital status: Single   Tobacco Use   • Smoking status: Never Smoker   • Smokeless tobacco: Never Used   Vaping Use   • Vaping Use: Never used   Substance and Sexual Activity   • Alcohol use: No   • Drug use: No   • Sexual activity: Defer       REVIEW OF SYSTEMS  Review of Systems   Constitutional: Negative for activity change, appetite change, chills, diaphoresis, fatigue, fever, unexpected weight gain and unexpected weight loss.   HENT: Negative for trouble swallowing and voice change.    Gastrointestinal: Negative for abdominal distention, abdominal pain, anal bleeding, blood in stool, constipation, diarrhea, nausea, rectal pain, vomiting, GERD and indigestion.         PHYSICAL EXAM   /79 (BP Location: Left arm, Patient Position: Sitting, Cuff Size: Adult)   Pulse 106   Temp 97.7 °F (36.5 °C) (Temporal)   Ht 188 cm (74\")   Wt 121 kg (267 lb 6.4 oz)   BMI 34.33 kg/m²   Physical Exam  Vitals and nursing note reviewed.   Constitutional:       Appearance: Normal appearance. He is well-developed. "   HENT:      Head: Normocephalic and atraumatic.      Nose: Nose normal.      Mouth/Throat:      Mouth: Mucous membranes are moist.      Pharynx: Oropharynx is clear.   Eyes:      Pupils: Pupils are equal, round, and reactive to light.   Cardiovascular:      Rate and Rhythm: Normal rate and regular rhythm.   Pulmonary:      Effort: Pulmonary effort is normal.      Breath sounds: Normal breath sounds. No wheezing or rales.   Abdominal:      General: Bowel sounds are normal.      Palpations: Abdomen is soft. There is no mass.      Tenderness: There is no abdominal tenderness. There is no guarding or rebound.      Hernia: No hernia is present.   Musculoskeletal:         General: Normal range of motion.      Cervical back: Normal range of motion and neck supple.   Skin:     General: Skin is warm and dry.   Neurological:      Mental Status: He is alert and oriented to person, place, and time.      Cranial Nerves: No cranial nerve deficit.   Psychiatric:         Behavior: Behavior normal.         Judgment: Judgment normal.           Results Review:  EXAMINATION: US ABDOMEN, LIMITED-02/03/2021:     INDICATION: Cirrhosis of liver; K74.60-Unspecified cirrhosis of liver,  no abdominal pain, controlled hypertension.     TECHNIQUE: Sonographic imaging was obtained of the right upper quadrant  in both the sagittal and transverse planes.     COMPARISON: NONE.     FINDINGS: The pancreas is homogeneous and unremarkable in its visualized  portions. The liver is heterogeneous and coarse in its architecture with  no evidence of focal mass or intrahepatic biliary ductal dilatation. No  underlying mass or lesion visualized. The gallbladder reveals sludge. No  definite stones. No wall thickening. The common bile duct measures 4 mm.  The right kidney is normal in size, configuration, and texture measuring  in length from pole to pole 9.4 cm. No solid mass or renal cortical cyst  identified within the right kidney. No hydronephrosis  or  nephrolithiasis.     IMPRESSION:  Heterogeneous appearance and coarse echotexture identified  within the liver with no definite underlying mass or lesion present.  Sludge seen within the gallbladder with no definite stones or wall  thickening. No biliary ductal dilatation.    ASSESSMENT / PLAN  1.  Crohn's disease  2.  Cirrhosis compensated  Plan:  -Continue Cimzia  -Ultrasound abdomen for HCC surveillance  -CBC, CMP, PT/INR, AFP    Return in about 6 months (around 4/8/2022).    I discussed the patients findings and my recommendations with patient    Faustino Blanca, APRN

## 2021-10-09 LAB — ALPHA-FETOPROTEIN: 4.71 NG/ML (ref 0–8.3)

## (undated) DEVICE — ENDOGATOR HYBRID TUBING KIT FOR USE WITH ENDOGATOR IRRIGATION PUMP, OLYMPUS PUMP, GI4000 ESU, AND TORRENT IRRIGATION PUMP.: Brand: ENDOGATOR KIT

## (undated) DEVICE — CONTN GRAD MEAS TRIANG 32OZ BLK

## (undated) DEVICE — "MH-438 A/W VLVE F/140 EVIS-140": Brand: AIR/WATER VALVE

## (undated) DEVICE — "MH-443 SUCTION VALVE F/EVIS140 EVIS160": Brand: SUCTION VALVE

## (undated) DEVICE — TUBING,OXYGEN,CRUSH RES,7',CLEAR,UC: Brand: MEDLINE INDUSTRIES, INC.

## (undated) DEVICE — THE BITE BLOCK MAXI, LATEX FREE STRAP IS USED TO PROTECT THE ENDOSCOPE INSERTION TUBE FROM BEING BITTEN BY THE PATIENT.

## (undated) DEVICE — SYR LUERLOK 50ML

## (undated) DEVICE — Device: Brand: AIR/WATER CHANNEL CLEANING ADAPTER

## (undated) DEVICE — SINGLE-USE BIOPSY FORCEPS: Brand: RADIAL JAW 4